# Patient Record
Sex: FEMALE | Race: WHITE | HISPANIC OR LATINO | URBAN - NONMETROPOLITAN AREA
[De-identification: names, ages, dates, MRNs, and addresses within clinical notes are randomized per-mention and may not be internally consistent; named-entity substitution may affect disease eponyms.]

---

## 2023-05-18 ENCOUNTER — APPOINTMENT (RX ONLY)
Dept: URBAN - NONMETROPOLITAN AREA CLINIC 40 | Facility: CLINIC | Age: 38
Setting detail: DERMATOLOGY
End: 2023-05-18

## 2023-05-18 DIAGNOSIS — L63.8 OTHER ALOPECIA AREATA: ICD-10-CM

## 2023-05-18 PROCEDURE — ? PRESCRIPTION

## 2023-05-18 PROCEDURE — 99203 OFFICE O/P NEW LOW 30 MIN: CPT

## 2023-05-18 PROCEDURE — ? COUNSELING

## 2023-05-18 RX ORDER — MOMETASONE FUROATE 1 MG/G
CREAM TOPICAL
Qty: 45 | Refills: 1 | Status: ERX | COMMUNITY
Start: 2023-05-18

## 2023-05-18 RX ADMIN — MOMETASONE FUROATE: 1 CREAM TOPICAL at 00:00

## 2023-05-18 ASSESSMENT — LOCATION ZONE DERM: LOCATION ZONE: SCALP

## 2023-05-18 ASSESSMENT — LOCATION SIMPLE DESCRIPTION DERM: LOCATION SIMPLE: SCALP

## 2023-05-18 ASSESSMENT — LOCATION DETAILED DESCRIPTION DERM: LOCATION DETAILED: LEFT CENTRAL PARIETAL SCALP

## 2023-05-18 NOTE — PROCEDURE: COUNSELING
Patient Specific Counseling (Will Not Stick From Patient To Patient): Pt recently had blood work done and was all normal, pt has rheumatoid arthritis. \\nAdvised May tx with additional medication once she is done breastfeeding.
Detail Level: Detailed